# Patient Record
Sex: FEMALE | Race: BLACK OR AFRICAN AMERICAN | NOT HISPANIC OR LATINO | URBAN - METROPOLITAN AREA
[De-identification: names, ages, dates, MRNs, and addresses within clinical notes are randomized per-mention and may not be internally consistent; named-entity substitution may affect disease eponyms.]

---

## 2017-08-08 ENCOUNTER — EMERGENCY (EMERGENCY)
Facility: HOSPITAL | Age: 57
LOS: 1 days | Discharge: HOME | End: 2017-08-08
Attending: EMERGENCY MEDICINE | Admitting: EMERGENCY MEDICINE
Payer: COMMERCIAL

## 2017-08-08 VITALS
RESPIRATION RATE: 16 BRPM | HEART RATE: 66 BPM | DIASTOLIC BLOOD PRESSURE: 85 MMHG | SYSTOLIC BLOOD PRESSURE: 156 MMHG | OXYGEN SATURATION: 100 %

## 2017-08-08 VITALS
OXYGEN SATURATION: 100 % | RESPIRATION RATE: 16 BRPM | HEART RATE: 64 BPM | TEMPERATURE: 98 F | DIASTOLIC BLOOD PRESSURE: 88 MMHG | SYSTOLIC BLOOD PRESSURE: 162 MMHG

## 2017-08-08 DIAGNOSIS — Z90.710 ACQUIRED ABSENCE OF BOTH CERVIX AND UTERUS: Chronic | ICD-10-CM

## 2017-08-08 PROCEDURE — 99284 EMERGENCY DEPT VISIT MOD MDM: CPT | Mod: 25

## 2017-08-08 PROCEDURE — 73502 X-RAY EXAM HIP UNI 2-3 VIEWS: CPT | Mod: 26,LT

## 2017-08-08 RX ORDER — OXYCODONE AND ACETAMINOPHEN 5; 325 MG/1; MG/1
1 TABLET ORAL ONCE
Qty: 0 | Refills: 0 | Status: DISCONTINUED | OUTPATIENT
Start: 2017-08-08 | End: 2017-08-08

## 2017-08-08 RX ORDER — KETOROLAC TROMETHAMINE 30 MG/ML
30 SYRINGE (ML) INJECTION ONCE
Qty: 0 | Refills: 0 | Status: DISCONTINUED | OUTPATIENT
Start: 2017-08-08 | End: 2017-08-08

## 2017-08-08 RX ADMIN — OXYCODONE AND ACETAMINOPHEN 1 TABLET(S): 5; 325 TABLET ORAL at 08:46

## 2017-08-08 RX ADMIN — OXYCODONE AND ACETAMINOPHEN 1 TABLET(S): 5; 325 TABLET ORAL at 10:09

## 2017-08-08 RX ADMIN — Medication 30 MILLIGRAM(S): at 10:09

## 2017-08-08 RX ADMIN — Medication 30 MILLIGRAM(S): at 08:46

## 2017-08-08 NOTE — ED PROVIDER NOTE - MEDICAL DECISION MAKING DETAILS
56yo f here for 2 days left hip pain, full ROM without any tenderness on exam, no traumatic history, no history osteoporosis. Likely functional MSK pain secondary to working in garden. No need for xray at this time. D/c home w pain management 58yo f here for 2 days left hip pain, full ROM without any tenderness on exam, no traumatic history, no history osteoporosis. Likely functional MSK pain secondary to working in garden. X-ray left hip and pelvis. Pain treatment

## 2017-08-08 NOTE — ED PROVIDER NOTE - MUSCULOSKELETAL MINIMAL EXAM
motor intact/atraumatic/full ROM of left hip without tenderness to palpation; patient only complains of pain when moves from supine to seated/normal range of motion/no muscle tenderness

## 2017-08-08 NOTE — ED PROVIDER NOTE - OBJECTIVE STATEMENT
The patient is a 57-year-old female complaining of 2 days hand pain. Sunday afternoon went to water plants in garden, felt sudden onset of left-sided hip pain. Did not fall or otherwise injure herself, does not believe she was doing any unusual motions at the time. No scratches or external irritation to affected area. Pain started out mild but progressively worsened throughout the day. No radiation. Made worse by walking or sitting for extended periods of time. Aleve did not provide any relief. Denies fever, abdominal pain, dysuria, pain in other joints. The patient is a 57-year-old female complaining of 2 days hand pain. Sunday afternoon went to water plants in garden, felt sudden onset of left-sided hip pain. Did not fall or otherwise injure herself, does not believe she was doing any unusual motions at the time. No scratches or external irritation to affected area. Pain started out mild but progressively worsened throughout the day. No radiation. Made worse by walking or sitting for extended periods of time. Aleve did not provide any relief. Denies fever, abdominal pain, dysuria, pain in other joints. No numb,tingling or weakness

## 2017-08-08 NOTE — ED ADULT TRIAGE NOTE - CHIEF COMPLAINT QUOTE
Pt arrives w/ c/o left hip pain that began suddenly yesterday and now causing difficulty with ambulation. Denies any recent falls or trauma.

## 2017-08-08 NOTE — ED PROVIDER NOTE - CARE PLAN
Principal Discharge DX:	Hip pain, acute, left  Instructions for follow-up, activity and diet:	You were seen in the emergency department for left hip pain. Your hip joint was normal on exam, and you had an x-ray that was normal, with no evidence of a fracture. Please follow up with your primary doctor for the hip pain and for high blood pressure. Take advil every 4 hours as needed for pain at home. Return to the emergency department if pain worsens, you develop fevers or pain in other joints, or you have any other concerning features. Principal Discharge DX:	Hip pain, acute, left  Instructions for follow-up, activity and diet:	You were seen in the emergency department for left hip pain. Your hip joint was normal on exam, and you had an x-ray that was normal, with no evidence of a fracture. Please follow up with your primary doctor for the hip pain and for high blood pressure in the next few days. Take Ibuprofen 200 mg every 6 to 8  hours as needed for pain at home or tylenol 325 mg every 4 hours as needed for pain.. Return to the emergency department if pain worsens, you develop fevers or pain in other joints, or you have any other concerning features.

## 2017-08-08 NOTE — ED PROVIDER NOTE - PLAN OF CARE
You were seen in the emergency department for left hip pain. Your hip joint was normal on exam, and you had an x-ray that was normal, with no evidence of a fracture. Please follow up with your primary doctor for the hip pain and for high blood pressure. Take advil every 4 hours as needed for pain at home. Return to the emergency department if pain worsens, you develop fevers or pain in other joints, or you have any other concerning features. You were seen in the emergency department for left hip pain. Your hip joint was normal on exam, and you had an x-ray that was normal, with no evidence of a fracture. Please follow up with your primary doctor for the hip pain and for high blood pressure in the next few days. Take Ibuprofen 200 mg every 6 to 8  hours as needed for pain at home or tylenol 325 mg every 4 hours as needed for pain.. Return to the emergency department if pain worsens, you develop fevers or pain in other joints, or you have any other concerning features.

## 2017-08-08 NOTE — ED PROVIDER NOTE - ATTENDING CONTRIBUTION TO CARE
I performed a face to face evaluation of this patient and performed a full history and physical examination on the patient.  I agree with the resident's history, physical examination, and plan of the patient.  Pt with left hip pain, nontraumatic with normal neuro, skin and MSK exam except pain with movement.  Xrays, pain meds and reassess

## 2018-09-23 ENCOUNTER — EMERGENCY (EMERGENCY)
Facility: HOSPITAL | Age: 58
LOS: 1 days | Discharge: HOME | End: 2018-09-23
Attending: EMERGENCY MEDICINE | Admitting: EMERGENCY MEDICINE
Payer: COMMERCIAL

## 2018-09-23 VITALS
HEART RATE: 57 BPM | DIASTOLIC BLOOD PRESSURE: 83 MMHG | RESPIRATION RATE: 16 BRPM | TEMPERATURE: 98 F | SYSTOLIC BLOOD PRESSURE: 157 MMHG | OXYGEN SATURATION: 100 %

## 2018-09-23 DIAGNOSIS — Z90.710 ACQUIRED ABSENCE OF BOTH CERVIX AND UTERUS: Chronic | ICD-10-CM

## 2018-09-23 PROBLEM — E03.9 HYPOTHYROIDISM, UNSPECIFIED: Chronic | Status: ACTIVE | Noted: 2017-08-08

## 2018-09-23 PROCEDURE — 99282 EMERGENCY DEPT VISIT SF MDM: CPT

## 2018-09-23 RX ORDER — IBUPROFEN 200 MG
600 TABLET ORAL ONCE
Qty: 0 | Refills: 0 | Status: COMPLETED | OUTPATIENT
Start: 2018-09-23 | End: 2018-09-23

## 2018-09-23 RX ADMIN — Medication 600 MILLIGRAM(S): at 14:37

## 2018-09-23 NOTE — ED PROVIDER NOTE - MEDICAL DECISION MAKING DETAILS
wrist sprain. NVI. already in volar splint. no signs of tendon disruption. Plan for RICE and hand surgery f/u. Xrays in PACS, read by attending radiology without fx.
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
## 2018-09-23 NOTE — ED PROVIDER NOTE - NS_ ATTENDINGSCRIBEDETAILS _ED_A_ED_FT
The scribe's documentation has been prepared under my direction and personally reviewed by me, Laura Rangel MD, in its entirety. I confirm that the note above accurately reflects all work, treatment, procedures, and medical decision making performed by me.

## 2018-09-23 NOTE — ED PROVIDER NOTE - MUSCULOSKELETAL, MLM
No midline tenderness, able to turn head 45 degrees to each side. +b/l paraspinal tenderness, no step offs. Sensations intact in medial, radial, and ulnar nerve distrubution. Present radial pulse, cap refill less than 2 seconds. No snuffbox tenderness. Swelling to dorsal aspect of L wrist. +swelling and tenderness to distal forearm at the posterior aspect. Motor intact in ulnar, median and radial nerve distributions.
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
## 2018-09-25 ENCOUNTER — TRANSCRIPTION ENCOUNTER (OUTPATIENT)
Age: 58
End: 2018-09-25

## 2019-07-18 ENCOUNTER — APPOINTMENT (OUTPATIENT)
Dept: UROLOGY | Facility: CLINIC | Age: 59
End: 2019-07-18
Payer: COMMERCIAL

## 2019-07-18 VITALS
OXYGEN SATURATION: 96 % | WEIGHT: 234.5 LBS | BODY MASS INDEX: 36.81 KG/M2 | HEIGHT: 67 IN | TEMPERATURE: 98.3 F | SYSTOLIC BLOOD PRESSURE: 144 MMHG | HEART RATE: 62 BPM | DIASTOLIC BLOOD PRESSURE: 78 MMHG

## 2019-07-18 DIAGNOSIS — Z63.5 DISRUPTION OF FAMILY BY SEPARATION AND DIVORCE: ICD-10-CM

## 2019-07-18 DIAGNOSIS — Z86.39 PERSONAL HISTORY OF OTHER ENDOCRINE, NUTRITIONAL AND METABOLIC DISEASE: ICD-10-CM

## 2019-07-18 DIAGNOSIS — R31.29 OTHER MICROSCOPIC HEMATURIA: ICD-10-CM

## 2019-07-18 DIAGNOSIS — Z83.3 FAMILY HISTORY OF DIABETES MELLITUS: ICD-10-CM

## 2019-07-18 DIAGNOSIS — Z82.49 FAMILY HISTORY OF ISCHEMIC HEART DISEASE AND OTHER DISEASES OF THE CIRCULATORY SYSTEM: ICD-10-CM

## 2019-07-18 PROCEDURE — 99202 OFFICE O/P NEW SF 15 MIN: CPT

## 2019-07-18 RX ORDER — LEVOTHYROXINE SODIUM 0.15 MG/1
150 TABLET ORAL
Refills: 0 | Status: ACTIVE | COMMUNITY

## 2019-07-18 RX ORDER — MULTIVIT-MIN/FOLIC/VIT K/LYCOP 400-300MCG
TABLET ORAL
Refills: 0 | Status: ACTIVE | COMMUNITY

## 2019-07-18 RX ORDER — MULTIVIT-MIN/FOLIC/VIT K/LYCOP 400-300MCG
50 MCG TABLET ORAL
Refills: 0 | Status: ACTIVE | COMMUNITY

## 2019-07-18 SDOH — SOCIAL STABILITY - SOCIAL INSECURITY: DISRUPTION OF FAMILY BY SEPARATION AND DIVORCE: Z63.5

## 2019-07-18 NOTE — PHYSICAL EXAM
[General Appearance - Well Developed] : well developed [General Appearance - Well Nourished] : well nourished [Normal Appearance] : normal appearance [Well Groomed] : well groomed [General Appearance - In No Acute Distress] : no acute distress [Edema] : no peripheral edema [Respiration, Rhythm And Depth] : normal respiratory rhythm and effort [Exaggerated Use Of Accessory Muscles For Inspiration] : no accessory muscle use [Abdomen Soft] : soft [Abdomen Tenderness] : non-tender [Costovertebral Angle Tenderness] : no ~M costovertebral angle tenderness [Urinary Bladder Findings] : the bladder was normal on palpation [Normal Station and Gait] : the gait and station were normal for the patient's age [] : no rash [No Focal Deficits] : no focal deficits [Oriented To Time, Place, And Person] : oriented to person, place, and time [Affect] : the affect was normal [Mood] : the mood was normal [Not Anxious] : not anxious [Femoral Lymph Nodes Enlarged Bilaterally] : femoral [Inguinal Lymph Nodes Enlarged Bilaterally] : inguinal

## 2019-07-18 NOTE — REVIEW OF SYSTEMS
[Date of last menstrual period ____] : date of last menstrual period: [unfilled] [Told you have blood in urine on a urine test] : told blood was present in a urine test [Wake up at night to urinate  How many times?  ___] : wakes up to urinate [unfilled] times during the night [Hot Flashes] : hot flashes [Negative] : Heme/Lymph [Dysuria] : no dysuria [Incontinence] : no incontinence [Vaginal Discharge] : no vaginal discharge

## 2019-07-18 NOTE — HISTORY OF PRESENT ILLNESS
[FreeTextEntry1] : JULISSA BETH is a 59 year old F who presents with microscopic hematuria for the first time. She has absolutely no voiding complaints and denies any prior h/o gross hematuria, stones, UTI's. There are no c/o regarding urgency, frequency, incontinence or difficulty emptying. She voids regularly q 2 hrs because she hydrates aggressively and reports nocturia 3x's/evening because she hydrates at night too.\par \par FH is non contributory - please see ROS and plan

## 2019-07-18 NOTE — LETTER BODY
[Dear  ___] : Dear  [unfilled], [Consult Letter:] : I had the pleasure of evaluating your patient, [unfilled]. [Please see my note below.] : Please see my note below. [Consult Closing:] : Thank you very much for allowing me to participate in the care of this patient.  If you have any questions, please do not hesitate to contact me. [FreeTextEntry1] : JULISSA BETH is a 59 year old F who presents with microscopic hematuria for the first time. She has absolutely no voiding complaints and denies any prior h/o gross hematuria, stones, UTI's. There are no c/o regarding urgency, frequency, incontinence or difficulty emptying. She voids regularly q 2 hrs because she hydrates aggressively and reports nocturia 3x's/evening because she hydrates at night too.\par \par This patient brought studies with her from 7/2/19 showing no blood on urinalysis and a negative microscopic study as well. There was no pyuria and culture was negative. Cytology was negative for cancer but showed a few RBC's.  Cytology should not be done to assess for presence or absence of microscopic hematuria.  Being that this is the first time she was told she had urine and she has been with you for quite some time. Therefore, we will send out urine for repeat study to determine what if anything needs to be done.\par \par There is luckily longevity in her family and no FH of  malignancies, anomalies, ESRD or stones.  There are also no c/o excessive bleeding issues and FH is negative for SCD, SCT or any other blood abnormalities. \par \par I will contact patient if anything further needs to be done. [FreeTextEntry3] : Sincerely,\par \par Shell Hanson MD\par [facility]\par

## 2019-07-19 LAB
APPEARANCE: CLEAR
BACTERIA: NEGATIVE
BILIRUBIN URINE: NEGATIVE
BLOOD URINE: NEGATIVE
COLOR: YELLOW
GLUCOSE QUALITATIVE U: NEGATIVE
HYALINE CASTS: 0 /LPF
KETONES URINE: NEGATIVE
LEUKOCYTE ESTERASE URINE: NEGATIVE
MICROSCOPIC-UA: NORMAL
NITRITE URINE: NEGATIVE
PH URINE: 6.5
PROTEIN URINE: NEGATIVE
RED BLOOD CELLS URINE: 1 /HPF
SPECIFIC GRAVITY URINE: 1.02
SQUAMOUS EPITHELIAL CELLS: 1 /HPF
URINE CYTOLOGY: NORMAL
UROBILINOGEN URINE: NORMAL
WHITE BLOOD CELLS URINE: 1 /HPF

## 2019-07-21 LAB — BACTERIA UR CULT: NORMAL

## 2022-11-09 ENCOUNTER — NON-APPOINTMENT (OUTPATIENT)
Age: 62
End: 2022-11-09

## 2023-05-18 ENCOUNTER — APPOINTMENT (OUTPATIENT)
Dept: ORTHOPEDIC SURGERY | Facility: CLINIC | Age: 63
End: 2023-05-18
Payer: COMMERCIAL

## 2023-05-18 VITALS — BODY MASS INDEX: 35.16 KG/M2 | HEIGHT: 67 IN | WEIGHT: 224 LBS

## 2023-05-18 DIAGNOSIS — M17.11 UNILATERAL PRIMARY OSTEOARTHRITIS, RIGHT KNEE: ICD-10-CM

## 2023-05-18 DIAGNOSIS — M17.12 UNILATERAL PRIMARY OSTEOARTHRITIS, LEFT KNEE: ICD-10-CM

## 2023-05-18 PROCEDURE — 73564 X-RAY EXAM KNEE 4 OR MORE: CPT | Mod: 50

## 2023-05-18 PROCEDURE — 99204 OFFICE O/P NEW MOD 45 MIN: CPT | Mod: 25

## 2023-05-18 PROCEDURE — 76881 US COMPL JOINT R-T W/IMG: CPT

## 2023-05-18 PROCEDURE — 20611 DRAIN/INJ JOINT/BURSA W/US: CPT | Mod: 50

## 2023-05-18 RX ORDER — MELOXICAM 15 MG/1
15 TABLET ORAL
Qty: 30 | Refills: 0 | Status: ACTIVE | COMMUNITY
Start: 2023-05-18 | End: 1900-01-01

## 2023-05-18 NOTE — HISTORY OF PRESENT ILLNESS
[9] : 9 [2] : 2 [Rest] : rest [Meds] : meds [Stairs] : stairs [de-identified] : 62 year old female with pain in bilateral knees, right worse than left. Symptoms started about a month ago and progressively gotten a little worse. PAin with descending stairs, getting up from a seated position and walking prolonged periods. No mechanical symptoms or prior history of injury. At work she takes an occasional Aleve with some help.  [] : no [FreeTextEntry1] : bilateral knees  [FreeTextEntry5] : pt has been having bilateral knee pain for about a month now [FreeTextEntry7] : sometimes gets numbness to her left ankle  [FreeTextEntry9] : advil  [de-identified] : movement  [de-identified] : her PCP

## 2023-05-18 NOTE — PROCEDURE
[Large Joint Injection] : Large joint injection [Bilateral] : bilaterally of the [Knee] : knee [Betadine] : betadine [] : Patient tolerated procedure well [Call if redness, pain or fever occur] : call if redness, pain or fever occur [Apply ice for 15min out of every hour for the next 12-24 hours as tolerated] : apply ice for 15 minutes out of every hour for the next 12-24 hours as tolerated [Patient was advised to rest the joint(s) for ____ days] : patient was advised to rest the joint(s) for [unfilled] days [All ultrasound images have been permanently captured and stored accordingly in our picture archiving and communication system] : All ultrasound images have been permanently captured and stored accordingly in our picture archiving and communication system [Pain] : pain [Inflammation] : inflammation

## 2023-05-18 NOTE — PHYSICAL EXAM
[Right] : right knee [Left] : left knee [5___] : hamstring 5[unfilled]/5 [Bilateral] : knee bilaterally [Degenerative change] : Degenerative change [All Views] : anteroposterior, lateral, skyline, and anteroposterior standing [advanced tricompartmental OA with medial compartment narrowing and varus alignment] : advanced tricompartmental OA with medial compartment narrowing and varus alignment [] : no calf tenderness [TWNoteComboBox7] : flexion 125 degrees [de-identified] : extension 0 degrees

## 2023-05-18 NOTE — DISCUSSION/SUMMARY
[de-identified] : Patient allowed to gently start resuming activities.\par Discussed change to medication prescription and usage. \par Offered cortisone steroid injection. \par Bracing options discussed with patient. \par Hyaluronic Acid inj pamphlet given to pt. \par May need TKAs in future\par 05/18/2023 \par \par  RE:  JULISSA BETH \par \par Acct #- [id number] \par \par \par Attention:  Nurse Reviewer /Medical Director\par \par I am writing this letter as a medical necessity for PT program.\par Patient has tried analgesics, non-steroid anti-inflammatory agents, \par hot or cold compresses,injections of corticosteroids, etc)  which in combination or by themselves has not worked.\par Based on my patient's condition, I strongly believe that the PT is medically needed.\par  \par Thank you for your time and consideration.   \par \par